# Patient Record
Sex: FEMALE | Race: WHITE
[De-identification: names, ages, dates, MRNs, and addresses within clinical notes are randomized per-mention and may not be internally consistent; named-entity substitution may affect disease eponyms.]

---

## 2019-09-25 ENCOUNTER — HOSPITAL ENCOUNTER (OUTPATIENT)
Dept: HOSPITAL 74 - FASU | Age: 79
Discharge: HOME | End: 2019-09-25
Attending: OPHTHALMOLOGY
Payer: COMMERCIAL

## 2019-09-25 VITALS — BODY MASS INDEX: 28.3 KG/M2

## 2019-09-25 VITALS — SYSTOLIC BLOOD PRESSURE: 131 MMHG | DIASTOLIC BLOOD PRESSURE: 62 MMHG | HEART RATE: 61 BPM

## 2019-09-25 VITALS — TEMPERATURE: 97.8 F

## 2019-09-25 DIAGNOSIS — H26.8: Primary | ICD-10-CM

## 2019-09-25 PROCEDURE — 08RK3JZ REPLACEMENT OF LEFT LENS WITH SYNTHETIC SUBSTITUTE, PERCUTANEOUS APPROACH: ICD-10-PCS | Performed by: OPHTHALMOLOGY

## 2019-09-25 RX ADMIN — TROPICAMIDE ONE DROP: 10 SOLUTION/ DROPS OPHTHALMIC at 08:10

## 2019-09-25 RX ADMIN — PHENYLEPHRINE HYDROCHLORIDE ONE DROP: 0.25 SPRAY NASAL at 08:05

## 2019-09-25 RX ADMIN — CYCLOPENTOLATE HYDROCHLORIDE ONE DROP: 20 SOLUTION/ DROPS OPHTHALMIC at 08:05

## 2019-09-25 RX ADMIN — PHENYLEPHRINE HYDROCHLORIDE ONE DROP: 0.25 SPRAY NASAL at 08:15

## 2019-09-25 RX ADMIN — CIPROFLOXACIN HYDROCHLORIDE ONE DROP: 3 SOLUTION/ DROPS OPHTHALMIC at 08:05

## 2019-09-25 RX ADMIN — CYCLOPENTOLATE HYDROCHLORIDE ONE DROP: 20 SOLUTION/ DROPS OPHTHALMIC at 08:15

## 2019-09-25 RX ADMIN — CIPROFLOXACIN HYDROCHLORIDE ONE DROP: 3 SOLUTION/ DROPS OPHTHALMIC at 08:10

## 2019-09-25 RX ADMIN — CIPROFLOXACIN HYDROCHLORIDE ONE DROP: 3 SOLUTION/ DROPS OPHTHALMIC at 08:15

## 2019-09-25 RX ADMIN — TROPICAMIDE ONE DROP: 10 SOLUTION/ DROPS OPHTHALMIC at 08:15

## 2019-09-25 RX ADMIN — CYCLOPENTOLATE HYDROCHLORIDE ONE DROP: 20 SOLUTION/ DROPS OPHTHALMIC at 08:10

## 2019-09-25 RX ADMIN — TROPICAMIDE ONE DROP: 10 SOLUTION/ DROPS OPHTHALMIC at 08:05

## 2019-09-25 RX ADMIN — PHENYLEPHRINE HYDROCHLORIDE ONE DROP: 0.25 SPRAY NASAL at 08:10

## 2022-04-29 ENCOUNTER — HOSPITAL ENCOUNTER (EMERGENCY)
Dept: HOSPITAL 74 - JER | Age: 82
Discharge: HOME | End: 2022-04-29
Payer: COMMERCIAL

## 2022-04-29 VITALS — DIASTOLIC BLOOD PRESSURE: 61 MMHG | SYSTOLIC BLOOD PRESSURE: 159 MMHG | HEART RATE: 63 BPM

## 2022-04-29 VITALS — BODY MASS INDEX: 27.4 KG/M2

## 2022-04-29 VITALS — TEMPERATURE: 98.2 F

## 2022-04-29 DIAGNOSIS — E11.65: Primary | ICD-10-CM

## 2022-04-29 LAB
ALBUMIN SERPL-MCNC: 4.3 G/DL (ref 3.4–5)
ALP SERPL-CCNC: 85 U/L (ref 45–117)
ALT SERPL-CCNC: 33 U/L (ref 13–61)
ANION GAP SERPL CALC-SCNC: 4 MMOL/L (ref 8–16)
AST SERPL-CCNC: 18 U/L (ref 15–37)
BASE EXCESS BLDV CALC-SCNC: -1 MMOL/L (ref -2–2)
BASOPHILS # BLD: 0.8 % (ref 0–2)
BILIRUB SERPL-MCNC: 0.8 MG/DL (ref 0.2–1)
BUN SERPL-MCNC: 14.9 MG/DL (ref 7–18)
CALCIUM SERPL-MCNC: 9.6 MG/DL (ref 8.5–10.1)
CHLORIDE SERPL-SCNC: 108 MMOL/L (ref 98–107)
CO2 SERPL-SCNC: 29 MMOL/L (ref 21–32)
CREAT SERPL-MCNC: 0.9 MG/DL (ref 0.55–1.3)
DEPRECATED RDW RBC AUTO: 13.8 % (ref 11.6–15.6)
EOSINOPHIL # BLD: 2.6 % (ref 0–4.5)
GLUCOSE SERPL-MCNC: 203 MG/DL (ref 74–106)
HCT VFR BLD CALC: 38.8 % (ref 32.4–45.2)
HCT VFR BLDV CALC: 41 % (ref 32.4–45.2)
HGB BLD-MCNC: 13 GM/DL (ref 10.7–15.3)
LYMPHOCYTES # BLD: 25.8 % (ref 8–40)
MCH RBC QN AUTO: 26.4 PG (ref 25.7–33.7)
MCHC RBC AUTO-ENTMCNC: 33.5 G/DL (ref 32–36)
MCV RBC: 78.8 FL (ref 80–96)
MONOCYTES # BLD AUTO: 7.2 % (ref 3.8–10.2)
NEUTROPHILS # BLD: 63.6 % (ref 42.8–82.8)
PCO2 BLDV: 51.3 MMHG (ref 38–52)
PH BLDV: 7.32 [PH] (ref 7.31–7.41)
PLATELET # BLD AUTO: 169 10^3/UL (ref 134–434)
PMV BLD: 7.7 FL (ref 7.5–11.1)
PROT SERPL-MCNC: 7.7 G/DL (ref 6.4–8.2)
RBC # BLD AUTO: 4.92 M/MM3 (ref 3.6–5.2)
SAO2 % BLDV: 32.5 % (ref 70–80)
SODIUM SERPL-SCNC: 141 MMOL/L (ref 136–145)
WBC # BLD AUTO: 7.7 K/MM3 (ref 4–10)

## 2022-09-28 ENCOUNTER — HOSPITAL ENCOUNTER (OUTPATIENT)
Dept: HOSPITAL 74 - FASU | Age: 82
Discharge: HOME | End: 2022-09-28
Attending: OPHTHALMOLOGY
Payer: COMMERCIAL

## 2022-09-28 VITALS
TEMPERATURE: 97.8 F | SYSTOLIC BLOOD PRESSURE: 135 MMHG | DIASTOLIC BLOOD PRESSURE: 64 MMHG | RESPIRATION RATE: 16 BRPM | HEART RATE: 61 BPM

## 2022-09-28 VITALS — BODY MASS INDEX: 27.4 KG/M2

## 2022-09-28 DIAGNOSIS — H26.8: Primary | ICD-10-CM

## 2022-09-28 PROCEDURE — 66984 XCAPSL CTRC RMVL W/O ECP: CPT

## 2022-09-28 PROCEDURE — 08RJ3JZ REPLACEMENT OF RIGHT LENS WITH SYNTHETIC SUBSTITUTE, PERCUTANEOUS APPROACH: ICD-10-PCS | Performed by: OPHTHALMOLOGY

## 2023-09-26 NOTE — OP
DATE OF OPERATION:  09/25/2019

 

OPERATIVE PROCEDURE:  Lens phacoemulsification with posterior chamber intraocular

lens placement left eye.

 

PREOPERATIVE DIAGNOSIS:  Visually significant cataract of left eye.

 

POSTOPERATIVE DIAGNOSIS:  Visually significant cataract of left eye.

 

SURGEON:  Rahul Mak M.D.

 

ANESTHESIA:  MAC

 

PROCEDURE:  The patient was brought to the operating room and placed under monitored

anesthesia care by Anesthesia.  A drop of Tetracaine was then placed over the left

eye.  The patient was then prepped and draped in the usual sterile manner.  A

speculum was then placed over the left eye. The eye was then well irrigated with

copious amounts of BSS (balanced salt solution). 

 

The operating microscope was then moved into position.  A paracentesis was performed

using a 15 degree blade.  At this point 0.5 mL of 1% preservative-free lidocaine was

injected into the anterior chamber.  Amvisc plus was then injected into the anterior

chamber.  A clear corneal incision was then formed using a 2.2 mm keratome. A

capsulorrhexis was then performed in a continuous circular fashion beginning with a

cystotome, completed with an Utratas forceps. Hydrodissection was then performed

using BSS on a cannula. The phaco probe was then introduced through the corneal wound

and the cataract was removed using the phaco chop technique.  Approximately 3 seconds

of absolute phaco time was used.  The remaining cortex was then removed using

irrigation and aspiration with an I/A probe. The capsule was then filled with regular

Amvisc and the capsule was noted to be intact.  A previously selected foldable

posterior chamber intraocular lens was then injected into the capsule through the

corneal wound using a lens injector.  It was then dialed into position using a

Sinskey hook.  The Amvisc was then removed using irrigation and aspiration.  Miostat

was then injected through the paracentesis to constrict the pupil.  The paracentesis

and corneal wound were then hydrated and noted to be water tight. A drop of Maxitrol

was then placed over the eye.  The speculum was removed and clear shield was taped

over the eye. 

 

The patient tolerated the procedure well and there were no surgical complications.

The patient was asked to follow up in my office the next day.

 

 

 

RAHUL MAK M.D.

 

[id number]

DD: 09/25/2019 09:57

DT: 09/25/2019 13:14

Job #:  [id number] Steffany RAMIREZ has recently accepted a new role and not currently taking new pt.